# Patient Record
Sex: MALE | Race: BLACK OR AFRICAN AMERICAN | URBAN - METROPOLITAN AREA
[De-identification: names, ages, dates, MRNs, and addresses within clinical notes are randomized per-mention and may not be internally consistent; named-entity substitution may affect disease eponyms.]

---

## 2021-02-02 ENCOUNTER — ANESTHESIA (OUTPATIENT)
Dept: OPERATING ROOM | Age: 46
End: 2021-02-02
Payer: COMMERCIAL

## 2021-02-02 ENCOUNTER — ANESTHESIA EVENT (OUTPATIENT)
Dept: OPERATING ROOM | Age: 46
End: 2021-02-02
Payer: COMMERCIAL

## 2021-02-02 ENCOUNTER — HOSPITAL ENCOUNTER (OUTPATIENT)
Age: 46
Setting detail: OUTPATIENT SURGERY
Discharge: HOME | End: 2021-02-02
Attending: INTERNAL MEDICINE | Admitting: INTERNAL MEDICINE
Payer: COMMERCIAL

## 2021-02-02 VITALS — SYSTOLIC BLOOD PRESSURE: 109 MMHG | OXYGEN SATURATION: 93 % | DIASTOLIC BLOOD PRESSURE: 79 MMHG

## 2021-02-02 VITALS
SYSTOLIC BLOOD PRESSURE: 142 MMHG | RESPIRATION RATE: 18 BRPM | OXYGEN SATURATION: 100 % | DIASTOLIC BLOOD PRESSURE: 85 MMHG | HEART RATE: 81 BPM | TEMPERATURE: 97.9 F | HEIGHT: 67 IN | WEIGHT: 264 LBS | BODY MASS INDEX: 41.44 KG/M2

## 2021-02-02 PROCEDURE — 7100000011 HC PHASE II RECOVERY - ADDTL 15 MIN: Performed by: INTERNAL MEDICINE

## 2021-02-02 PROCEDURE — 7100000010 HC PHASE II RECOVERY - FIRST 15 MIN: Performed by: INTERNAL MEDICINE

## 2021-02-02 PROCEDURE — 3609012400 HC EGD TRANSORAL BIOPSY SINGLE/MULTIPLE: Performed by: INTERNAL MEDICINE

## 2021-02-02 PROCEDURE — 6360000002 HC RX W HCPCS: Performed by: NURSE ANESTHETIST, CERTIFIED REGISTERED

## 2021-02-02 PROCEDURE — 3700000001 HC ADD 15 MINUTES (ANESTHESIA): Performed by: INTERNAL MEDICINE

## 2021-02-02 PROCEDURE — 2720000010 HC SURG SUPPLY STERILE: Performed by: INTERNAL MEDICINE

## 2021-02-02 PROCEDURE — 2709999900 HC NON-CHARGEABLE SUPPLY: Performed by: INTERNAL MEDICINE

## 2021-02-02 PROCEDURE — 3609155100 HC COLONOSCOPY W/ BAND LIGATION(S): Performed by: INTERNAL MEDICINE

## 2021-02-02 PROCEDURE — 88305 TISSUE EXAM BY PATHOLOGIST: CPT

## 2021-02-02 PROCEDURE — 2500000003 HC RX 250 WO HCPCS: Performed by: NURSE ANESTHETIST, CERTIFIED REGISTERED

## 2021-02-02 PROCEDURE — 88342 IMHCHEM/IMCYTCHM 1ST ANTB: CPT

## 2021-02-02 PROCEDURE — 2580000003 HC RX 258: Performed by: ANESTHESIOLOGY

## 2021-02-02 PROCEDURE — 3700000000 HC ANESTHESIA ATTENDED CARE: Performed by: INTERNAL MEDICINE

## 2021-02-02 RX ORDER — SODIUM CHLORIDE, SODIUM LACTATE, POTASSIUM CHLORIDE, CALCIUM CHLORIDE 600; 310; 30; 20 MG/100ML; MG/100ML; MG/100ML; MG/100ML
INJECTION, SOLUTION INTRAVENOUS CONTINUOUS
Status: DISCONTINUED | OUTPATIENT
Start: 2021-02-02 | End: 2021-02-02 | Stop reason: HOSPADM

## 2021-02-02 RX ORDER — PROPOFOL 10 MG/ML
INJECTION, EMULSION INTRAVENOUS CONTINUOUS PRN
Status: DISCONTINUED | OUTPATIENT
Start: 2021-02-02 | End: 2021-02-02 | Stop reason: SDUPTHER

## 2021-02-02 RX ORDER — ONDANSETRON 2 MG/ML
4 INJECTION INTRAMUSCULAR; INTRAVENOUS
Status: DISCONTINUED | OUTPATIENT
Start: 2021-02-02 | End: 2021-02-02 | Stop reason: HOSPADM

## 2021-02-02 RX ORDER — ASPIRIN 81 MG/1
81 TABLET ORAL DAILY
COMMUNITY

## 2021-02-02 RX ORDER — FENTANYL CITRATE 50 UG/ML
INJECTION, SOLUTION INTRAMUSCULAR; INTRAVENOUS PRN
Status: DISCONTINUED | OUTPATIENT
Start: 2021-02-02 | End: 2021-02-02 | Stop reason: SDUPTHER

## 2021-02-02 RX ORDER — SIMVASTATIN 20 MG
20 TABLET ORAL DAILY
COMMUNITY
Start: 2021-01-28

## 2021-02-02 RX ORDER — LIDOCAINE HYDROCHLORIDE 20 MG/ML
INJECTION, SOLUTION EPIDURAL; INFILTRATION; INTRACAUDAL; PERINEURAL PRN
Status: DISCONTINUED | OUTPATIENT
Start: 2021-02-02 | End: 2021-02-02 | Stop reason: SDUPTHER

## 2021-02-02 RX ORDER — POLYETHYLENE GLYCOL 3350 17 G/17G
17 POWDER, FOR SOLUTION ORAL DAILY
COMMUNITY

## 2021-02-02 RX ORDER — M-VIT,TX,IRON,MINS/CALC/FOLIC 27MG-0.4MG
1 TABLET ORAL DAILY
COMMUNITY

## 2021-02-02 RX ORDER — LISINOPRIL 10 MG/1
10 TABLET ORAL DAILY
COMMUNITY
Start: 2020-12-03

## 2021-02-02 RX ORDER — DOCUSATE SODIUM 100 MG/1
100 CAPSULE, LIQUID FILLED ORAL DAILY
COMMUNITY

## 2021-02-02 RX ORDER — PROPOFOL 10 MG/ML
INJECTION, EMULSION INTRAVENOUS PRN
Status: DISCONTINUED | OUTPATIENT
Start: 2021-02-02 | End: 2021-02-02 | Stop reason: SDUPTHER

## 2021-02-02 RX ADMIN — Medication 50 MCG: at 08:07

## 2021-02-02 RX ADMIN — PROPOFOL 30 MG: 10 INJECTION, EMULSION INTRAVENOUS at 07:42

## 2021-02-02 RX ADMIN — LIDOCAINE HYDROCHLORIDE 100 MG: 20 INJECTION, SOLUTION EPIDURAL; INFILTRATION; INTRACAUDAL; PERINEURAL at 07:40

## 2021-02-02 RX ADMIN — SODIUM CHLORIDE, POTASSIUM CHLORIDE, SODIUM LACTATE AND CALCIUM CHLORIDE: 600; 310; 30; 20 INJECTION, SOLUTION INTRAVENOUS at 06:55

## 2021-02-02 RX ADMIN — PROPOFOL 200 MCG/KG/MIN: 10 INJECTION, EMULSION INTRAVENOUS at 07:42

## 2021-02-02 ASSESSMENT — PULMONARY FUNCTION TESTS
PIF_VALUE: 0

## 2021-02-02 ASSESSMENT — PAIN SCALES - GENERAL: PAINLEVEL_OUTOF10: 0

## 2021-02-02 ASSESSMENT — ENCOUNTER SYMPTOMS: SHORTNESS OF BREATH: 0

## 2021-02-02 ASSESSMENT — PAIN - FUNCTIONAL ASSESSMENT: PAIN_FUNCTIONAL_ASSESSMENT: 0-10

## 2021-02-02 NOTE — ANESTHESIA PRE PROCEDURE
Department of Anesthesiology  Preprocedure Note       Name:  Lexi Kohli   Age:  39 y.o.  :  1975                                          MRN:  [id number]         Date:  2021      Surgeon: Palmira Hong): Piyush Horowitz MD    Procedure: Procedure(s):  COLONOSCOPY W/ BAND LIGATION(S)  EGD ESOPHAGOGASTRODUODENOSCOPY    Medications prior to admission:   Prior to Admission medications    Medication Sig Start Date End Date Taking? Authorizing Provider   lisinopril (PRINIVIL;ZESTRIL) 10 MG tablet Take 10 mg by mouth daily 12/3/20  Yes Historical Provider, MD   simvastatin (ZOCOR) 20 MG tablet Take 20 mg by mouth daily 21  Yes Historical Provider, MD   aspirin 81 MG EC tablet Take 81 mg by mouth daily   Yes Historical Provider, MD   Multiple Vitamins-Minerals (THERAPEUTIC MULTIVITAMIN-MINERALS) tablet Take 1 tablet by mouth daily   Yes Historical Provider, MD   docusate sodium (COLACE) 100 MG capsule Take 100 mg by mouth daily   Yes Historical Provider, MD   polyethylene glycol (GLYCOLAX) 17 GM/SCOOP powder Take 17 g by mouth daily   Yes Historical Provider, MD       Current medications:    Current Facility-Administered Medications   Medication Dose Route Frequency Provider Last Rate Last Admin    lactated ringers infusion   Intravenous Continuous Ndal Bucky Mabry  mL/hr at 21 0655 New Bag at 21 0655    lidocaine 1% (buffered) injection 0.5 mL  0.5 mL Infiltration Once Cyrus Cody MD           Allergies:  No Known Allergies    Problem List:  There is no problem list on file for this patient.       Past Medical History:        Diagnosis Date    Hyperlipidemia     Hypertension        Past Surgical History:        Procedure Laterality Date    COLONOSCOPY         Social History:    Social History     Tobacco Use    Smoking status: Never Smoker    Smokeless tobacco: Never Used   Substance Use Topics    Alcohol use: Not Currently     Comment: rarely Counseling given: Not Answered      Vital Signs (Current):   Vitals:    02/02/21 0628 02/02/21 0645   BP: (!) 141/76    Pulse: 72    Resp: 18    Temp: 98.2 °F (36.8 °C)    TempSrc: Temporal    SpO2: 100%    Weight:  264 lb (119.7 kg)   Height:  5' 7\" (1.702 m)                                              BP Readings from Last 3 Encounters:   02/02/21 (!) 141/76       NPO Status: Time of last liquid consumption: 2200                        Time of last solid consumption: 2000                        Date of last liquid consumption: 02/01/21                        Date of last solid food consumption: 01/31/21    BMI:   Wt Readings from Last 3 Encounters:   02/02/21 264 lb (119.7 kg)     Body mass index is 41.35 kg/m². CBC: No results found for: WBC, RBC, HGB, HCT, MCV, RDW, PLT    CMP: No results found for: NA, K, CL, CO2, BUN, CREATININE, GFRAA, AGRATIO, LABGLOM, GLUCOSE, PROT, CALCIUM, BILITOT, ALKPHOS, AST, ALT    POC Tests: No results for input(s): POCGLU, POCNA, POCK, POCCL, POCBUN, POCHEMO, POCHCT in the last 72 hours.     Coags: No results found for: PROTIME, INR, APTT    HCG (If Applicable): No results found for: PREGTESTUR, PREGSERUM, HCG, HCGQUANT     ABGs: No results found for: PHART, PO2ART, WTY6QLE, MIN1DXZ, BEART, H0YLNHME     Type & Screen (If Applicable):  No results found for: LABABO, LABRH    Drug/Infectious Status (If Applicable):  No results found for: HIV, HEPCAB    COVID-19 Screening (If Applicable): No results found for: COVID19      Anesthesia Evaluation    Airway: Mallampati: III  TM distance: >3 FB   Neck ROM: full  Mouth opening: > = 3 FB Dental:          Pulmonary:       (-) shortness of breath                           Cardiovascular:    (+) hypertension:,     (-)  angina                Neuro/Psych:               GI/Hepatic/Renal:             Endo/Other:                     Abdominal:           Vascular:                                        Anesthesia Plan general     ASA 3             Anesthetic plan and risks discussed with patient.                       Eber Cope MD   2/2/2021

## 2021-02-02 NOTE — OP NOTE
Operative Note      Patient: Stephen Grant  YOB: 1975  MRN: [id number]    Date of Procedure: 2/2/2021    Pre-Op Diagnosis: DX HEMORRHOIDS    Indication for the procedure: Patient is a pleasant 39years old male who is seen with heme positive stool and hematochezia. Is scheduled for colonoscopy for further evaluation. He has been also complaining of left-sided abdominal pain. Post-Op Diagnosis: Internal hemorrhoids s/p hemorrhoidal banding    Procedure(s):  COLONOSCOPY WITH BIOPSY  W/ BAND LIGATION(S)  EGD BIOPSY    Surgeon(s): Nubia Ndiaye MD    Assistant:   * No surgical staff found *    Informed consent: Risks, benefits, and alternatives of the procedure were explained to the patient prior to the procedure. Risks include but not limited to bleeding, perforation, aspiration, allergic reaction to medication or death. Patient was also informed about the risk of missing a lesion.        Anesthesia: Monitor Anesthesia Care    Estimated Blood Loss (mL): Minimal    Complications: None    Specimens:   ID Type Source Tests Collected by Time Destination   A : BIOPSY DUODENUM Tissue Duodenum SURGICAL PATHOLOGY Nubia Ndiaye MD 2/2/2021 0746    B : GASTRIC BIOPSY Tissue Stomach SURGICAL PATHOLOGY Nubia Ndiaye MD 2/2/2021 0747    C : BIOPSY DISTAL ESOPHAGUS Tissue Stomach SURGICAL PATHOLOGY Nubia Ndiaye MD 2/2/2021 3838    D : BIOPSY PROXIMAL ESOPHAGUS Tissue Stomach SURGICAL PATHOLOGY Nubia Ndiaye MD 2/2/2021 0750    E : RANDUM COLON BIOPSYS Tissue Colon SURGICAL PATHOLOGY Nubia Ndiaye MD 2/2/2021 0759        Implants:  * No implants in log *      Drains: * No LDAs found *    Findings: Internal hemorrhoids s/p application of 5 hemorrhoidal bands    Detailed Description of Procedure: Patient was placed in the left lateral decubitus position. Initially digital rectal exam was performed. No rectal mass was appreciated. The well-lubricated colon colonoscope was passed through the rectum into the cecum. Cecum was identified by appendiceal orifice and ileocecal valve. Terminal ileum was intubated and appeared normal.  The scope was then brought back into the cecum. Cecal mucosa was carefully examined and appeared normal.  Biopsies were taken from the colon randomly. Ascending colon mucosa, transverse colon mucosa and descending colon mucosa appeared normal.  Sigmoid colon mucosa rectal mucosa appeared normal.  Retroflexion was done. Internal hemorrhoids were seen. Subsequently EGD loaded with Endo variceal band ligator was inserted into the rectum and 5 bands were applied. No significant bleeding seen. The scope was then withdrawn outside the patient. Plan: 1. Follow up on results of pathology  2-follow-up in the office in 1 to 2 weeks.     Electronically signed by Trevin Massey MD on 2/2/2021 at 8:13 AM

## 2021-02-02 NOTE — ANESTHESIA POSTPROCEDURE EVALUATION
Department of Anesthesiology  Postprocedure Note    Patient: Alina Slade  MRN: [id number]  YOB: 1975  Date of evaluation: 2/2/2021  Time:  1:43 PM     Procedure Summary     Date: 02/02/21 Room / Location: [address]    Anesthesia Start: 4222 Anesthesia Stop: Ole Rhoda    Procedures:       COLONOSCOPY WITH BIOPSY  W/ BAND LIGATION(S) (N/A )      EGD BIOPSY Diagnosis: (DX HEMORRHOIDS)    Surgeons: David Sequeira MD Responsible Provider: Richi Lemos MD    Anesthesia Type: general ASA Status: 3          Anesthesia Type: general    Vida Phase I:      Vida Phase II: Vida Score: 10    Last vitals: Reviewed and per EMR flowsheets.        Anesthesia Post Evaluation    Complications: no

## 2021-02-02 NOTE — OP NOTE
Operative Note      Patient: Lita Head  YOB: 1975  MRN: [id number]    Date of Procedure: 2/2/2021    Pre-Op Diagnosis: Left-sided abdominal pain    Post-Op Diagnosis: Gastritis otherwise normal EGD       Procedure(s):  COLONOSCOPY WITH BIOPSY  W/ BAND LIGATION(S)  EGD BIOPSY    Surgeon(s): Laura Yao MD    Assistant:   * No surgical staff found *    Informed consent: Risks, benefits, and alternatives of the procedure were explained to the patient prior to the procedure. Risks include but not limited to bleeding, perforation, aspiration, allergic reaction to medication or death. Patient was also informed about the risk of missing a lesion. Anesthesia: Monitor Anesthesia Care    Estimated Blood Loss (mL): Minimal    Complications: None    Specimens:   ID Type Source Tests Collected by Time Destination   A : BIOPSY DUODENUM Tissue Duodenum SURGICAL PATHOLOGY Laura Yao MD 2/2/2021 0746    B : GASTRIC BIOPSY Tissue Stomach SURGICAL PATHOLOGY Laura Yao MD 2/2/2021 0798    C : BIOPSY DISTAL ESOPHAGUS Tissue Stomach SURGICAL PATHOLOGY Laura Yao MD 2/2/2021 7207    D : BIOPSY PROXIMAL ESOPHAGUS Tissue Stomach SURGICAL PATHOLOGY Laura Yao MD 2/2/2021 0750    E : RANDUM COLON BIOPSYS Tissue Colon SURGICAL PATHOLOGY Laura Yao MD 2/2/2021 0759        Implants:  * No implants in log *      Drains: * No LDAs found *    Findings: 1-patchy hyperemia and gastric erosions in the gastric antrum and gastric body.   Biopsies were taken throughout H. pylori    Detailed Description of Procedure: Patient was placed in the left lateral decubitus position. The well-lubricated Olympus EGD scope was passed through the bite-block was advanced into the esophagus. Esophageal mucosa of the upper middle esophagus appeared normal.  Biopsies were taken. The scope was then advanced into the distal esophagus. Biopsies were taken to rule out Bahena's esophagus. The scope was then advanced into the stomach. There was patchy hyperemia and gastric erosions. Biopsies were taken to rule out H. pylori. Retroflexion was done. The cardia and fundus of stomach were seen and appeared normal.  Pylorus was intubated. Duodenal bulb had patchy hyperemia. Second portion of the duodenum appeared normal.  Biopsies were taken to rule out celiac disease. Plan: 1. Follow up on results of pathology  2-follow-up in the office in 1 to 2 weeks.     Electronically signed by Romana Noble, MD on 2/2/2021 at 8:11 AM

## 2021-02-02 NOTE — H&P
History and Physical Update    Pt Name: Tere Dumont  MRN: [id number]  YOB: 1975  Date of evaluation: 2/2/2021      [x] I have reviewed the hardcopy Gastroenterology Progress Note by Dr Brigitte Pan dated 1/27/21 labeled in short chart which meets the criteria for an Interval History and Physical note . [x] I have examined  Ilya Powers  There are no changes to the patient who is scheduled for a colonoscopy with band ligations by Dr Brigitte Pan for hemorrhoids. The patient followed the bowel prep as directed until clear yellow Previous colonoscopy 4 years ago with polyps removed. No FH colon cancer  Denies bowel changes, fever, chills, wheezing, cough, increased SOB, chest pain, open sores or wounds. Last ASA 81mg 2/1/21    Past Medical History:     Past Medical History:   Diagnosis Date    Hyperlipidemia     Hypertension         Past Surgical History:     Past Surgical History:   Procedure Laterality Date    COLONOSCOPY          Social History:     Tobacco:    reports that he has never smoked. He has never used smokeless tobacco.  Alcohol:      reports previous alcohol use. Drug Use:  reports no history of drug use. Family History:     History reviewed. No pertinent family history. Vital signs: BP (!) 141/76   Pulse 72   Temp 98.2 °F (36.8 °C) (Temporal)   Resp 18   Ht 5' 7\" (1.702 m)   Wt 264 lb (119.7 kg)   SpO2 100%   BMI 41.35 kg/m²     Allergies:  Patient has no known allergies. Medications:    Prior to Admission medications    Medication Sig Start Date End Date Taking?  Authorizing Provider   lisinopril (PRINIVIL;ZESTRIL) 10 MG tablet Take 10 mg by mouth daily 12/3/20  Yes Historical Provider, MD   simvastatin (ZOCOR) 20 MG tablet Take 20 mg by mouth daily 1/28/21  Yes Historical Provider, MD   aspirin 81 MG EC tablet Take 81 mg by mouth daily   Yes Historical Provider, MD Multiple Vitamins-Minerals (THERAPEUTIC MULTIVITAMIN-MINERALS) tablet Take 1 tablet by mouth daily   Yes Historical Provider, MD   docusate sodium (COLACE) 100 MG capsule Take 100 mg by mouth daily   Yes Historical Provider, MD   polyethylene glycol (GLYCOLAX) 17 GM/SCOOP powder Take 17 g by mouth daily   Yes Historical Provider, MD         This is a 39 y. o.morbidly obese male who is pleasant, cooperative, alert and oriented x3, in no acute distress. Heart: Heart sounds are normal.  HR 72 regular rate and rhythm without murmur, gallop or rub. Lungs: Normal respiratory effort with equal expansion, good air exchange, unlabored and clear to auscultation without wheezes or rales bilaterally   Abdomen: obese, moderately firm,.nontender, nondistended with bowel sounds . Labs:  No results for input(s): HGB, HCT, WBC, MCV, PLT, NA, K, CL, CO2, BUN, CREATININE, GLUCOSE, INR, PROTIME, APTT, AST, ALT, LABALBU, HCG in the last 720 hours. No results for input(s): COVID19 in the last 720 hours.     Lance Oppenheim APRN, ANP-BC  Electronically signed 2/2/2021 at 7:16 AM

## 2021-02-04 LAB — SURGICAL PATHOLOGY REPORT: NORMAL

## (undated) DEVICE — SIX SHOOTER SAEED MULTI-BAND LIGATOR: Brand: SAEED

## (undated) DEVICE — CUP MED 1OZ CLR POLYPR FEED GRAD W/O LID

## (undated) DEVICE — SYRINGE MED 50ML LUERLOCK TIP

## (undated) DEVICE — GAUZE,SPONGE,4"X4",16PLY,STRL,LF,10/TRAY: Brand: MEDLINE

## (undated) DEVICE — BLOCK BITE 60FR RUBBER ADLT DENTAL

## (undated) DEVICE — TRAP SURG QUAD PARABOLA SLOT DSGN SFTY SCRN TRAPEASE

## (undated) DEVICE — BASIN EMSIS 700ML GRAPHITE PLAS KID SHP GRAD

## (undated) DEVICE — ELECTRODE PT RET AD L9FT HI MOIST COND ADH HYDRGEL CORDED

## (undated) DEVICE — ADAPTER TBNG LUER STUB 15 GA INTMED

## (undated) DEVICE — GOWN,AURORA,NONREINFORCED,LARGE: Brand: MEDLINE

## (undated) DEVICE — MEDICINE CUP, GRADUATED, STER: Brand: MEDLINE

## (undated) DEVICE — Device: Brand: DEFENDO VALVE AND CONNECTOR KIT

## (undated) DEVICE — CONMED DISPOSABLE GASTROINTESTINAL CYTOLOGY BRUSH, STRAIGHT HANDLE, 2.5 MM X 160 CM: Brand: CONMED

## (undated) DEVICE — CO2 CANNULA,SUPERSOFT, ADLT,7'O2,7'CO2: Brand: MEDLINE

## (undated) DEVICE — GLOVE SURG SZ 8 L12IN THK91MIL BRN LTX FREE POLYCHLOROPRENE

## (undated) DEVICE — FORCEPS BX L240CM WRK CHN 2.8MM STD CAP W/ NDL MIC MESH

## (undated) DEVICE — TUBING, SUCTION, 1/4" X 12', STRAIGHT: Brand: MEDLINE

## (undated) DEVICE — JELLY,LUBE,STERILE,FLIP TOP,TUBE,2-OZ: Brand: MEDLINE